# Patient Record
Sex: MALE | Race: BLACK OR AFRICAN AMERICAN | NOT HISPANIC OR LATINO
[De-identification: names, ages, dates, MRNs, and addresses within clinical notes are randomized per-mention and may not be internally consistent; named-entity substitution may affect disease eponyms.]

---

## 2017-03-08 ENCOUNTER — APPOINTMENT (OUTPATIENT)
Age: 37
End: 2017-03-08

## 2017-03-09 ENCOUNTER — APPOINTMENT (OUTPATIENT)
Age: 37
End: 2017-03-09

## 2017-03-09 VITALS
WEIGHT: 160 LBS | DIASTOLIC BLOOD PRESSURE: 97 MMHG | SYSTOLIC BLOOD PRESSURE: 149 MMHG | HEART RATE: 72 BPM | RESPIRATION RATE: 14 BRPM | BODY MASS INDEX: 23.7 KG/M2 | TEMPERATURE: 98.3 F | HEIGHT: 69 IN

## 2017-03-10 LAB
ACE BLD-CCNC: 78 U/L
ALBUMIN SERPL ELPH-MCNC: 4.1 G/DL
ALP BLD-CCNC: 391 U/L
ALT SERPL-CCNC: 143 U/L
ANION GAP SERPL CALC-SCNC: 12 MMOL/L
AST SERPL-CCNC: 88 U/L
BILIRUB SERPL-MCNC: 0.6 MG/DL
BUN SERPL-MCNC: 10 MG/DL
CALCIUM SERPL-MCNC: 10 MG/DL
CHLORIDE SERPL-SCNC: 99 MMOL/L
CO2 SERPL-SCNC: 24 MMOL/L
CREAT SERPL-MCNC: 0.88 MG/DL
MITOCHONDRIA AB SER IF-ACNC: NORMAL
POTASSIUM SERPL-SCNC: 3.8 MMOL/L
PROT SERPL-MCNC: 8.2 G/DL
SODIUM SERPL-SCNC: 135 MMOL/L

## 2017-03-13 LAB
ALP BLD-CCNC: 386 U/L
ALP BONE CFR SERPL: 17 %
ALP INTEST CFR SERPL: 4 %
ALP LIVER CFR SERPL: 78 %
ALP MACRO CFR SERPL: 0 %
ALP PLAC CFR SERPL: 0 %

## 2017-04-16 ENCOUNTER — FORM ENCOUNTER (OUTPATIENT)
Age: 37
End: 2017-04-16

## 2017-04-17 ENCOUNTER — OUTPATIENT (OUTPATIENT)
Dept: OUTPATIENT SERVICES | Facility: HOSPITAL | Age: 37
LOS: 1 days | End: 2017-04-17
Payer: COMMERCIAL

## 2017-04-17 ENCOUNTER — APPOINTMENT (OUTPATIENT)
Dept: MRI IMAGING | Facility: IMAGING CENTER | Age: 37
End: 2017-04-17

## 2017-04-17 DIAGNOSIS — Z00.00 ENCOUNTER FOR GENERAL ADULT MEDICAL EXAMINATION WITHOUT ABNORMAL FINDINGS: ICD-10-CM

## 2017-04-17 PROCEDURE — 74183 MRI ABD W/O CNTR FLWD CNTR: CPT

## 2017-04-17 PROCEDURE — A9585: CPT

## 2017-05-25 ENCOUNTER — APPOINTMENT (OUTPATIENT)
Age: 37
End: 2017-05-25

## 2017-05-25 VITALS
WEIGHT: 163 LBS | TEMPERATURE: 97.9 F | HEART RATE: 75 BPM | SYSTOLIC BLOOD PRESSURE: 142 MMHG | RESPIRATION RATE: 14 BRPM | BODY MASS INDEX: 24.14 KG/M2 | DIASTOLIC BLOOD PRESSURE: 75 MMHG | HEIGHT: 69 IN

## 2017-05-25 LAB
ALBUMIN SERPL ELPH-MCNC: 4 G/DL
ALP BLD-CCNC: 313 U/L
ALT SERPL-CCNC: 88 U/L
ANION GAP SERPL CALC-SCNC: 15 MMOL/L
AST SERPL-CCNC: 61 U/L
BILIRUB SERPL-MCNC: 0.7 MG/DL
BUN SERPL-MCNC: 11 MG/DL
CALCIUM SERPL-MCNC: 10 MG/DL
CHLORIDE SERPL-SCNC: 99 MMOL/L
CO2 SERPL-SCNC: 24 MMOL/L
CREAT SERPL-MCNC: 0.82 MG/DL
POTASSIUM SERPL-SCNC: 3.6 MMOL/L
PROT SERPL-MCNC: 8.4 G/DL
SODIUM SERPL-SCNC: 138 MMOL/L

## 2017-07-24 ENCOUNTER — APPOINTMENT (OUTPATIENT)
Age: 37
End: 2017-07-24

## 2018-01-18 ENCOUNTER — APPOINTMENT (OUTPATIENT)
Age: 38
End: 2018-01-18

## 2018-04-04 ENCOUNTER — APPOINTMENT (OUTPATIENT)
Age: 38
End: 2018-04-04
Payer: COMMERCIAL

## 2018-04-04 VITALS
SYSTOLIC BLOOD PRESSURE: 130 MMHG | HEIGHT: 69 IN | RESPIRATION RATE: 12 BRPM | DIASTOLIC BLOOD PRESSURE: 82 MMHG | WEIGHT: 150 LBS | BODY MASS INDEX: 22.22 KG/M2 | TEMPERATURE: 97.5 F | HEART RATE: 64 BPM

## 2018-04-04 PROCEDURE — 99214 OFFICE O/P EST MOD 30 MIN: CPT

## 2018-04-05 LAB
ALBUMIN SERPL ELPH-MCNC: 4.6 G/DL
ALP BLD-CCNC: 365 U/L
ALT SERPL-CCNC: 150 U/L
ANION GAP SERPL CALC-SCNC: 15 MMOL/L
AST SERPL-CCNC: 116 U/L
BILIRUB SERPL-MCNC: 1.1 MG/DL
BUN SERPL-MCNC: 11 MG/DL
CALCIUM SERPL-MCNC: 10.1 MG/DL
CANCER AG19-9 SERPL-ACNC: 9.9 U/ML
CHLORIDE SERPL-SCNC: 96 MMOL/L
CO2 SERPL-SCNC: 25 MMOL/L
CREAT SERPL-MCNC: 1.26 MG/DL
POTASSIUM SERPL-SCNC: 4.1 MMOL/L
PROT SERPL-MCNC: 8.6 G/DL
SODIUM SERPL-SCNC: 136 MMOL/L

## 2018-08-02 ENCOUNTER — APPOINTMENT (OUTPATIENT)
Dept: HEPATOLOGY | Facility: CLINIC | Age: 38
End: 2018-08-02

## 2018-08-09 ENCOUNTER — APPOINTMENT (OUTPATIENT)
Dept: MRI IMAGING | Facility: IMAGING CENTER | Age: 38
End: 2018-08-09

## 2018-09-10 ENCOUNTER — FORM ENCOUNTER (OUTPATIENT)
Age: 38
End: 2018-09-10

## 2018-09-11 ENCOUNTER — APPOINTMENT (OUTPATIENT)
Dept: ULTRASOUND IMAGING | Facility: IMAGING CENTER | Age: 38
End: 2018-09-11
Payer: MEDICAID

## 2018-09-11 ENCOUNTER — OUTPATIENT (OUTPATIENT)
Dept: OUTPATIENT SERVICES | Facility: HOSPITAL | Age: 38
LOS: 1 days | End: 2018-09-11
Payer: COMMERCIAL

## 2018-09-11 DIAGNOSIS — R79.9 ABNORMAL FINDING OF BLOOD CHEMISTRY, UNSPECIFIED: ICD-10-CM

## 2018-09-11 PROCEDURE — 76700 US EXAM ABDOM COMPLETE: CPT

## 2018-09-11 PROCEDURE — 76700 US EXAM ABDOM COMPLETE: CPT | Mod: 26

## 2018-11-07 ENCOUNTER — APPOINTMENT (OUTPATIENT)
Dept: HEPATOLOGY | Facility: CLINIC | Age: 38
End: 2018-11-07
Payer: MEDICAID

## 2018-11-07 VITALS
SYSTOLIC BLOOD PRESSURE: 143 MMHG | BODY MASS INDEX: 21.92 KG/M2 | HEART RATE: 66 BPM | HEIGHT: 69 IN | TEMPERATURE: 98.1 F | RESPIRATION RATE: 16 BRPM | DIASTOLIC BLOOD PRESSURE: 86 MMHG | WEIGHT: 148 LBS

## 2018-11-07 PROCEDURE — 99214 OFFICE O/P EST MOD 30 MIN: CPT

## 2018-11-08 LAB
AFP-TM SERPL-MCNC: 1.6 NG/ML
ALBUMIN SERPL ELPH-MCNC: 4.3 G/DL
ALP BLD-CCNC: 257 U/L
ALT SERPL-CCNC: 78 U/L
ANION GAP SERPL CALC-SCNC: 15 MMOL/L
AST SERPL-CCNC: 65 U/L
BASOPHILS # BLD AUTO: 0.04 K/UL
BASOPHILS NFR BLD AUTO: 0.7 %
BILIRUB SERPL-MCNC: 1.2 MG/DL
BUN SERPL-MCNC: 8 MG/DL
CALCIUM SERPL-MCNC: 10.4 MG/DL
CANCER AG19-9 SERPL-ACNC: 10 U/ML
CHLORIDE SERPL-SCNC: 99 MMOL/L
CO2 SERPL-SCNC: 25 MMOL/L
CREAT SERPL-MCNC: 0.81 MG/DL
EOSINOPHIL # BLD AUTO: 0.37 K/UL
EOSINOPHIL NFR BLD AUTO: 6.7 %
HCT VFR BLD CALC: 43.8 %
HGB BLD-MCNC: 14.6 G/DL
IMM GRANULOCYTES NFR BLD AUTO: 0.2 %
INR PPP: 1.16 RATIO
LYMPHOCYTES # BLD AUTO: 2.18 K/UL
LYMPHOCYTES NFR BLD AUTO: 39.4 %
MAN DIFF?: NORMAL
MCHC RBC-ENTMCNC: 30.2 PG
MCHC RBC-ENTMCNC: 33.3 GM/DL
MCV RBC AUTO: 90.7 FL
MONOCYTES # BLD AUTO: 0.57 K/UL
MONOCYTES NFR BLD AUTO: 10.3 %
NEUTROPHILS # BLD AUTO: 2.36 K/UL
NEUTROPHILS NFR BLD AUTO: 42.7 %
PLATELET # BLD AUTO: 309 K/UL
POTASSIUM SERPL-SCNC: 5.2 MMOL/L
PROT SERPL-MCNC: 8.3 G/DL
PT BLD: 13.1 SEC
RBC # BLD: 4.83 M/UL
RBC # FLD: 13.5 %
SODIUM SERPL-SCNC: 139 MMOL/L
WBC # FLD AUTO: 5.53 K/UL

## 2018-12-03 ENCOUNTER — FORM ENCOUNTER (OUTPATIENT)
Age: 38
End: 2018-12-03

## 2018-12-04 ENCOUNTER — APPOINTMENT (OUTPATIENT)
Dept: MRI IMAGING | Facility: IMAGING CENTER | Age: 38
End: 2018-12-04
Payer: MEDICAID

## 2018-12-04 ENCOUNTER — OUTPATIENT (OUTPATIENT)
Dept: OUTPATIENT SERVICES | Facility: HOSPITAL | Age: 38
LOS: 1 days | End: 2018-12-04
Payer: MEDICAID

## 2018-12-04 DIAGNOSIS — R79.9 ABNORMAL FINDING OF BLOOD CHEMISTRY, UNSPECIFIED: ICD-10-CM

## 2018-12-04 PROCEDURE — A9585: CPT

## 2018-12-04 PROCEDURE — 74183 MRI ABD W/O CNTR FLWD CNTR: CPT

## 2018-12-04 PROCEDURE — 74183 MRI ABD W/O CNTR FLWD CNTR: CPT | Mod: 26

## 2018-12-19 ENCOUNTER — APPOINTMENT (OUTPATIENT)
Dept: HEPATOLOGY | Facility: CLINIC | Age: 38
End: 2018-12-19

## 2019-01-28 ENCOUNTER — APPOINTMENT (OUTPATIENT)
Dept: HEPATOLOGY | Facility: CLINIC | Age: 39
End: 2019-01-28
Payer: MEDICAID

## 2019-01-28 VITALS
HEIGHT: 69 IN | DIASTOLIC BLOOD PRESSURE: 80 MMHG | BODY MASS INDEX: 21.92 KG/M2 | WEIGHT: 148 LBS | SYSTOLIC BLOOD PRESSURE: 135 MMHG | TEMPERATURE: 97.6 F | HEART RATE: 85 BPM | RESPIRATION RATE: 16 BRPM

## 2019-01-28 PROCEDURE — 99214 OFFICE O/P EST MOD 30 MIN: CPT

## 2019-01-29 LAB
ALBUMIN SERPL ELPH-MCNC: 4 G/DL
ALP BLD-CCNC: 248 U/L
ALT SERPL-CCNC: 82 U/L
ANION GAP SERPL CALC-SCNC: 9 MMOL/L
AST SERPL-CCNC: 58 U/L
BILIRUB SERPL-MCNC: 1.2 MG/DL
BUN SERPL-MCNC: 11 MG/DL
CALCIUM SERPL-MCNC: 9.9 MG/DL
CHLORIDE SERPL-SCNC: 101 MMOL/L
CO2 SERPL-SCNC: 27 MMOL/L
CREAT SERPL-MCNC: 0.75 MG/DL
POTASSIUM SERPL-SCNC: 4.6 MMOL/L
PROT SERPL-MCNC: 8.1 G/DL
SODIUM SERPL-SCNC: 137 MMOL/L

## 2019-09-19 ENCOUNTER — APPOINTMENT (OUTPATIENT)
Dept: HEPATOLOGY | Facility: CLINIC | Age: 39
End: 2019-09-19

## 2020-02-06 ENCOUNTER — APPOINTMENT (OUTPATIENT)
Dept: HEPATOLOGY | Facility: CLINIC | Age: 40
End: 2020-02-06
Payer: MEDICAID

## 2020-02-06 VITALS
HEIGHT: 69 IN | DIASTOLIC BLOOD PRESSURE: 74 MMHG | TEMPERATURE: 98 F | WEIGHT: 145 LBS | RESPIRATION RATE: 16 BRPM | HEART RATE: 76 BPM | SYSTOLIC BLOOD PRESSURE: 119 MMHG | BODY MASS INDEX: 21.48 KG/M2

## 2020-02-06 PROCEDURE — 99214 OFFICE O/P EST MOD 30 MIN: CPT

## 2020-02-07 LAB
AFP-TM SERPL-MCNC: <1.8 NG/ML
ALBUMIN SERPL ELPH-MCNC: 3.9 G/DL
ALP BLD-CCNC: 408 U/L
ALT SERPL-CCNC: 89 U/L
ANION GAP SERPL CALC-SCNC: 14 MMOL/L
AST SERPL-CCNC: 52 U/L
BASOPHILS # BLD AUTO: 0.04 K/UL
BASOPHILS NFR BLD AUTO: 0.7 %
BILIRUB SERPL-MCNC: 1.1 MG/DL
BUN SERPL-MCNC: 10 MG/DL
CALCIUM SERPL-MCNC: 9.5 MG/DL
CANCER AG19-9 SERPL-ACNC: 9 U/ML
CHLORIDE SERPL-SCNC: 100 MMOL/L
CO2 SERPL-SCNC: 26 MMOL/L
CREAT SERPL-MCNC: 0.84 MG/DL
EOSINOPHIL # BLD AUTO: 0.42 K/UL
EOSINOPHIL NFR BLD AUTO: 7.1 %
HCT VFR BLD CALC: 38.8 %
HGB BLD-MCNC: 12.8 G/DL
IMM GRANULOCYTES NFR BLD AUTO: 0.3 %
LYMPHOCYTES # BLD AUTO: 2.25 K/UL
LYMPHOCYTES NFR BLD AUTO: 38 %
MAN DIFF?: NORMAL
MCHC RBC-ENTMCNC: 30.3 PG
MCHC RBC-ENTMCNC: 33 GM/DL
MCV RBC AUTO: 91.9 FL
MONOCYTES # BLD AUTO: 0.49 K/UL
MONOCYTES NFR BLD AUTO: 8.3 %
NEUTROPHILS # BLD AUTO: 2.7 K/UL
NEUTROPHILS NFR BLD AUTO: 45.6 %
PLATELET # BLD AUTO: 258 K/UL
POTASSIUM SERPL-SCNC: 4.1 MMOL/L
PROT SERPL-MCNC: 7.6 G/DL
RBC # BLD: 4.22 M/UL
RBC # FLD: 12.7 %
SODIUM SERPL-SCNC: 140 MMOL/L
WBC # FLD AUTO: 5.92 K/UL

## 2020-02-09 LAB — MITOCHONDRIA AB SER IF-ACNC: NORMAL

## 2020-02-21 ENCOUNTER — APPOINTMENT (OUTPATIENT)
Dept: HEPATOLOGY | Facility: CLINIC | Age: 40
End: 2020-02-21
Payer: MEDICAID

## 2020-02-21 PROCEDURE — 91200 LIVER ELASTOGRAPHY: CPT

## 2020-07-10 ENCOUNTER — OUTPATIENT (OUTPATIENT)
Dept: OUTPATIENT SERVICES | Facility: HOSPITAL | Age: 40
LOS: 1 days | End: 2020-07-10
Payer: MEDICAID

## 2020-07-10 ENCOUNTER — APPOINTMENT (OUTPATIENT)
Dept: MRI IMAGING | Facility: IMAGING CENTER | Age: 40
End: 2020-07-10
Payer: MEDICAID

## 2020-07-10 ENCOUNTER — RESULT REVIEW (OUTPATIENT)
Age: 40
End: 2020-07-10

## 2020-07-10 DIAGNOSIS — R79.9 ABNORMAL FINDING OF BLOOD CHEMISTRY, UNSPECIFIED: ICD-10-CM

## 2020-07-10 DIAGNOSIS — Z00.8 ENCOUNTER FOR OTHER GENERAL EXAMINATION: ICD-10-CM

## 2020-07-10 PROCEDURE — 74183 MRI ABD W/O CNTR FLWD CNTR: CPT | Mod: 26

## 2020-07-10 PROCEDURE — 74183 MRI ABD W/O CNTR FLWD CNTR: CPT

## 2020-07-10 PROCEDURE — A9585: CPT

## 2020-10-22 ENCOUNTER — APPOINTMENT (OUTPATIENT)
Dept: HEPATOLOGY | Facility: CLINIC | Age: 40
End: 2020-10-22
Payer: MEDICAID

## 2020-10-22 VITALS
HEIGHT: 69 IN | TEMPERATURE: 97 F | RESPIRATION RATE: 16 BRPM | DIASTOLIC BLOOD PRESSURE: 82 MMHG | WEIGHT: 151 LBS | SYSTOLIC BLOOD PRESSURE: 139 MMHG | BODY MASS INDEX: 22.36 KG/M2 | HEART RATE: 73 BPM

## 2020-10-22 LAB
ALBUMIN SERPL ELPH-MCNC: 4.2 G/DL
ALP BLD-CCNC: 305 U/L
ALT SERPL-CCNC: 163 U/L
ANION GAP SERPL CALC-SCNC: 13 MMOL/L
AST SERPL-CCNC: 97 U/L
BASOPHILS # BLD AUTO: 0.04 K/UL
BASOPHILS NFR BLD AUTO: 1 %
BILIRUB SERPL-MCNC: 1 MG/DL
BUN SERPL-MCNC: 14 MG/DL
CALCIUM SERPL-MCNC: 10 MG/DL
CANCER AG19-9 SERPL-ACNC: 8 U/ML
CHLORIDE SERPL-SCNC: 102 MMOL/L
CO2 SERPL-SCNC: 26 MMOL/L
CREAT SERPL-MCNC: 0.78 MG/DL
EOSINOPHIL # BLD AUTO: 0.33 K/UL
EOSINOPHIL NFR BLD AUTO: 8.6 %
HCT VFR BLD CALC: 43.3 %
HGB BLD-MCNC: 14.4 G/DL
IMM GRANULOCYTES NFR BLD AUTO: 0 %
LYMPHOCYTES # BLD AUTO: 1.4 K/UL
LYMPHOCYTES NFR BLD AUTO: 36.5 %
MAN DIFF?: NORMAL
MCHC RBC-ENTMCNC: 30.8 PG
MCHC RBC-ENTMCNC: 33.3 GM/DL
MCV RBC AUTO: 92.7 FL
MONOCYTES # BLD AUTO: 0.54 K/UL
MONOCYTES NFR BLD AUTO: 14.1 %
NEUTROPHILS # BLD AUTO: 1.53 K/UL
NEUTROPHILS NFR BLD AUTO: 39.8 %
PLATELET # BLD AUTO: 262 K/UL
POTASSIUM SERPL-SCNC: 4.1 MMOL/L
PROT SERPL-MCNC: 7.8 G/DL
RBC # BLD: 4.67 M/UL
RBC # FLD: 13.1 %
SODIUM SERPL-SCNC: 141 MMOL/L
WBC # FLD AUTO: 3.84 K/UL

## 2020-10-22 PROCEDURE — 99214 OFFICE O/P EST MOD 30 MIN: CPT

## 2020-10-23 LAB
INR PPP: 1.09 RATIO
PT BLD: 12.9 SEC

## 2020-11-30 LAB — IGG4 SER-MCNC: 74.8 MG/DL

## 2021-01-21 ENCOUNTER — APPOINTMENT (OUTPATIENT)
Dept: HEPATOLOGY | Facility: CLINIC | Age: 41
End: 2021-01-21
Payer: MEDICAID

## 2021-01-21 VITALS
WEIGHT: 155 LBS | SYSTOLIC BLOOD PRESSURE: 126 MMHG | TEMPERATURE: 98.2 F | HEIGHT: 69 IN | RESPIRATION RATE: 16 BRPM | HEART RATE: 81 BPM | DIASTOLIC BLOOD PRESSURE: 81 MMHG | BODY MASS INDEX: 22.96 KG/M2

## 2021-01-21 PROCEDURE — 99214 OFFICE O/P EST MOD 30 MIN: CPT

## 2021-01-21 PROCEDURE — 99072 ADDL SUPL MATRL&STAF TM PHE: CPT

## 2021-01-22 LAB
AFP-TM SERPL-MCNC: 2 NG/ML
ALBUMIN SERPL ELPH-MCNC: 4.4 G/DL
ALP BLD-CCNC: 295 U/L
ALT SERPL-CCNC: 195 U/L
ANION GAP SERPL CALC-SCNC: 15 MMOL/L
AST SERPL-CCNC: 127 U/L
BASOPHILS # BLD AUTO: 0.06 K/UL
BASOPHILS NFR BLD AUTO: 1 %
BILIRUB SERPL-MCNC: 1.1 MG/DL
BUN SERPL-MCNC: 13 MG/DL
CALCIUM SERPL-MCNC: 10.1 MG/DL
CANCER AG19-9 SERPL-ACNC: 7 U/ML
CHLORIDE SERPL-SCNC: 98 MMOL/L
CO2 SERPL-SCNC: 26 MMOL/L
CREAT SERPL-MCNC: 1 MG/DL
EOSINOPHIL # BLD AUTO: 0.49 K/UL
EOSINOPHIL NFR BLD AUTO: 8.4 %
HCT VFR BLD CALC: 46.8 %
HGB BLD-MCNC: 14.9 G/DL
IMM GRANULOCYTES NFR BLD AUTO: 0.2 %
INR PPP: 1.12 RATIO
LYMPHOCYTES # BLD AUTO: 2.06 K/UL
LYMPHOCYTES NFR BLD AUTO: 35.5 %
MAN DIFF?: NORMAL
MCHC RBC-ENTMCNC: 30 PG
MCHC RBC-ENTMCNC: 31.8 GM/DL
MCV RBC AUTO: 94.4 FL
MONOCYTES # BLD AUTO: 0.64 K/UL
MONOCYTES NFR BLD AUTO: 11 %
NEUTROPHILS # BLD AUTO: 2.54 K/UL
NEUTROPHILS NFR BLD AUTO: 43.9 %
PLATELET # BLD AUTO: 259 K/UL
POTASSIUM SERPL-SCNC: 4.3 MMOL/L
PROT SERPL-MCNC: 8.4 G/DL
PT BLD: 13.2 SEC
RBC # BLD: 4.96 M/UL
RBC # FLD: 13.2 %
SODIUM SERPL-SCNC: 139 MMOL/L
WBC # FLD AUTO: 5.8 K/UL

## 2021-03-05 ENCOUNTER — RESULT REVIEW (OUTPATIENT)
Age: 41
End: 2021-03-05

## 2021-03-05 ENCOUNTER — OUTPATIENT (OUTPATIENT)
Dept: OUTPATIENT SERVICES | Facility: HOSPITAL | Age: 41
LOS: 1 days | End: 2021-03-05
Payer: MEDICAID

## 2021-03-05 ENCOUNTER — APPOINTMENT (OUTPATIENT)
Dept: MRI IMAGING | Facility: IMAGING CENTER | Age: 41
End: 2021-03-05
Payer: MEDICAID

## 2021-03-05 DIAGNOSIS — R79.9 ABNORMAL FINDING OF BLOOD CHEMISTRY, UNSPECIFIED: ICD-10-CM

## 2021-03-05 DIAGNOSIS — Z00.8 ENCOUNTER FOR OTHER GENERAL EXAMINATION: ICD-10-CM

## 2021-03-05 PROCEDURE — 74183 MRI ABD W/O CNTR FLWD CNTR: CPT

## 2021-03-05 PROCEDURE — 74183 MRI ABD W/O CNTR FLWD CNTR: CPT | Mod: 26

## 2021-03-05 PROCEDURE — A9585: CPT

## 2021-03-09 ENCOUNTER — NON-APPOINTMENT (OUTPATIENT)
Age: 41
End: 2021-03-09

## 2021-09-15 ENCOUNTER — APPOINTMENT (OUTPATIENT)
Dept: HEPATOLOGY | Facility: CLINIC | Age: 41
End: 2021-09-15
Payer: MEDICAID

## 2021-09-15 VITALS
SYSTOLIC BLOOD PRESSURE: 132 MMHG | DIASTOLIC BLOOD PRESSURE: 83 MMHG | RESPIRATION RATE: 16 BRPM | HEIGHT: 69 IN | HEART RATE: 78 BPM | WEIGHT: 150 LBS | BODY MASS INDEX: 22.22 KG/M2 | TEMPERATURE: 97.9 F

## 2021-09-15 LAB
AFP-TM SERPL-MCNC: <1.8 NG/ML
ALBUMIN SERPL ELPH-MCNC: 4.2 G/DL
ALP BLD-CCNC: 258 U/L
ALT SERPL-CCNC: 228 U/L
ANION GAP SERPL CALC-SCNC: 13 MMOL/L
AST SERPL-CCNC: 180 U/L
BASOPHILS # BLD AUTO: 0.05 K/UL
BASOPHILS NFR BLD AUTO: 1.2 %
BILIRUB SERPL-MCNC: 1 MG/DL
BUN SERPL-MCNC: 12 MG/DL
CALCIUM SERPL-MCNC: 9.8 MG/DL
CANCER AG19-9 SERPL-ACNC: 5 U/ML
CHLORIDE SERPL-SCNC: 102 MMOL/L
CO2 SERPL-SCNC: 24 MMOL/L
CREAT SERPL-MCNC: 0.75 MG/DL
EOSINOPHIL # BLD AUTO: 0.37 K/UL
EOSINOPHIL NFR BLD AUTO: 8.6 %
HCT VFR BLD CALC: 42.2 %
HGB BLD-MCNC: 14.1 G/DL
IMM GRANULOCYTES NFR BLD AUTO: 0.2 %
LYMPHOCYTES # BLD AUTO: 1.48 K/UL
LYMPHOCYTES NFR BLD AUTO: 34.5 %
MAN DIFF?: NORMAL
MCHC RBC-ENTMCNC: 30.6 PG
MCHC RBC-ENTMCNC: 33.4 GM/DL
MCV RBC AUTO: 91.5 FL
MONOCYTES # BLD AUTO: 0.48 K/UL
MONOCYTES NFR BLD AUTO: 11.2 %
NEUTROPHILS # BLD AUTO: 1.9 K/UL
NEUTROPHILS NFR BLD AUTO: 44.3 %
PLATELET # BLD AUTO: 240 K/UL
POTASSIUM SERPL-SCNC: 4 MMOL/L
PROT SERPL-MCNC: 7.4 G/DL
RBC # BLD: 4.61 M/UL
RBC # FLD: 13.2 %
SODIUM SERPL-SCNC: 139 MMOL/L
WBC # FLD AUTO: 4.29 K/UL

## 2021-09-15 PROCEDURE — 99214 OFFICE O/P EST MOD 30 MIN: CPT

## 2021-12-17 ENCOUNTER — EMERGENCY (EMERGENCY)
Facility: HOSPITAL | Age: 41
LOS: 1 days | Discharge: HOME | End: 2021-12-17
Attending: STUDENT IN AN ORGANIZED HEALTH CARE EDUCATION/TRAINING PROGRAM
Payer: SELF-PAY

## 2021-12-17 VITALS
HEART RATE: 77 BPM | SYSTOLIC BLOOD PRESSURE: 150 MMHG | OXYGEN SATURATION: 97 % | HEIGHT: 69 IN | RESPIRATION RATE: 18 BRPM | TEMPERATURE: 98 F | WEIGHT: 160.06 LBS | DIASTOLIC BLOOD PRESSURE: 93 MMHG

## 2021-12-17 PROCEDURE — 71046 X-RAY EXAM CHEST 2 VIEWS: CPT | Mod: 26

## 2021-12-17 PROCEDURE — 99053 MED SERV 10PM-8AM 24 HR FAC: CPT

## 2021-12-17 PROCEDURE — 99284 EMERGENCY DEPT VISIT MOD MDM: CPT

## 2021-12-17 RX ORDER — IBUPROFEN 200 MG
600 TABLET ORAL ONCE
Refills: 0 | Status: COMPLETED | OUTPATIENT
Start: 2021-12-17 | End: 2021-12-17

## 2021-12-17 RX ORDER — ACETAMINOPHEN 500 MG
650 TABLET ORAL ONCE
Refills: 0 | Status: COMPLETED | OUTPATIENT
Start: 2021-12-17 | End: 2021-12-17
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
## 2021-12-18 PROCEDURE — 71046 X-RAY EXAM CHEST 2 VIEWS: CPT

## 2021-12-18 PROCEDURE — 99283 EMERGENCY DEPT VISIT LOW MDM: CPT | Mod: 25

## 2021-12-18 RX ADMIN — Medication 650 MILLIGRAM(S): at 00:00

## 2021-12-18 RX ADMIN — Medication 600 MILLIGRAM(S): at 00:00
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
## 2021-12-18 NOTE — ED PROVIDER NOTE - CHPI ED SYMPTOMS NEG
no neck pain, nausea, vomiting, diarrhea, blurry vision, chest pain, shortness of breath/no loss of consciousness

## 2021-12-18 NOTE — ED PROVIDER NOTE - PATIENT PORTAL LINK FT
You can access the FollowMyHealth Patient Portal offered by [facility] by registering at the following website: [email or web address]. By joining [employer]’s FollowMyHealth portal, you will also be able to view your health information using other applications (apps) compatible with our system.

## 2021-12-18 NOTE — ED PROVIDER NOTE - PHYSICAL EXAMINATION
Vital Signs Reviewed  GEN: Comfortable, NAD, AAOx3  HEENT: NCAT, MMM, Neck Supple  RESP: CTAB, No rales/rhonchi/wheezing  CV: RRR, S1S2, No murmurs  ABD: No TTP, Soft, ND, No masses, No CVA Tenderness  Extrem/Skin: Equal pulses bilat, No cyanosis/edema/rashes. Paraspinal midline TTP w/o deformities.  Neuro: No focal deficits
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
## 2022-01-25 ENCOUNTER — RX RENEWAL (OUTPATIENT)
Age: 42
End: 2022-01-25

## 2022-05-05 ENCOUNTER — APPOINTMENT (OUTPATIENT)
Dept: HEPATOLOGY | Facility: CLINIC | Age: 42
End: 2022-05-05

## 2022-06-03 PROBLEM — Z78.9 OTHER SPECIFIED HEALTH STATUS: Chronic | Status: ACTIVE | Noted: 2021-12-23

## 2022-07-18 ENCOUNTER — LABORATORY RESULT (OUTPATIENT)
Age: 42
End: 2022-07-18

## 2022-07-18 ENCOUNTER — APPOINTMENT (OUTPATIENT)
Dept: HEPATOLOGY | Facility: CLINIC | Age: 42
End: 2022-07-18

## 2022-07-18 VITALS
HEIGHT: 69 IN | SYSTOLIC BLOOD PRESSURE: 135 MMHG | HEART RATE: 57 BPM | OXYGEN SATURATION: 99 % | TEMPERATURE: 98 F | DIASTOLIC BLOOD PRESSURE: 72 MMHG | BODY MASS INDEX: 22.81 KG/M2 | WEIGHT: 154 LBS | RESPIRATION RATE: 16 BRPM

## 2022-07-18 DIAGNOSIS — Z78.9 OTHER SPECIFIED HEALTH STATUS: ICD-10-CM

## 2022-07-18 DIAGNOSIS — R79.9 ABNORMAL FINDING OF BLOOD CHEMISTRY, UNSPECIFIED: ICD-10-CM

## 2022-07-18 PROCEDURE — 99215 OFFICE O/P EST HI 40 MIN: CPT

## 2022-07-19 DIAGNOSIS — R79.89 OTHER SPECIFIED ABNORMAL FINDINGS OF BLOOD CHEMISTRY: ICD-10-CM

## 2022-07-19 PROBLEM — Z78.9 IMMUNE TO HEPATITIS A: Status: ACTIVE | Noted: 2022-07-19

## 2022-07-19 LAB
25(OH)D3 SERPL-MCNC: 16.2 NG/ML
AFP-TM SERPL-MCNC: 1.9 NG/ML
BASOPHILS # BLD AUTO: 0.05 K/UL
BASOPHILS NFR BLD AUTO: 1 %
BILIRUB DIRECT SERPL-MCNC: 0.6 MG/DL
COVID-19 SPIKE DOMAIN ANTIBODY INTERPRETATION: POSITIVE
DEPRECATED KAPPA LC FREE/LAMBDA SER: 1.52 RATIO
EOSINOPHIL # BLD AUTO: 0.34 K/UL
EOSINOPHIL NFR BLD AUTO: 6.5 %
FOLATE SERPL-MCNC: 8.1 NG/ML
GGT SERPL-CCNC: 200 U/L
HBV CORE IGG+IGM SER QL: REACTIVE
HBV SURFACE AB SER QL: NONREACTIVE
HCT VFR BLD CALC: 42.3 %
HCV AB SER QL: NONREACTIVE
HCV S/CO RATIO: 0.12 S/CO
HEPATITIS A IGG ANTIBODY: REACTIVE
HEPB DNA PCR INT: NOT DETECTED
HEPB DNA PCR LOG: NOT DETECTED LOGIU/ML
HGB BLD-MCNC: 13.9 G/DL
IGA SER QL IEP: 383 MG/DL
IGG SER QL IEP: 1654 MG/DL
IGM SER QL IEP: 26 MG/DL
IMM GRANULOCYTES NFR BLD AUTO: 0 %
INR PPP: 1.15 RATIO
KAPPA LC CSF-MCNC: 1.89 MG/DL
KAPPA LC SERPL-MCNC: 2.88 MG/DL
LYMPHOCYTES # BLD AUTO: 2.1 K/UL
LYMPHOCYTES NFR BLD AUTO: 40.2 %
MAN DIFF?: NORMAL
MCHC RBC-ENTMCNC: 31.4 PG
MCHC RBC-ENTMCNC: 32.9 GM/DL
MCV RBC AUTO: 95.5 FL
MONOCYTES # BLD AUTO: 0.6 K/UL
MONOCYTES NFR BLD AUTO: 11.5 %
NEUTROPHILS # BLD AUTO: 2.14 K/UL
NEUTROPHILS NFR BLD AUTO: 40.8 %
PLATELET # BLD AUTO: 235 K/UL
PT BLD: 13.4 SEC
RBC # BLD: 4.43 M/UL
RBC # FLD: 13.3 %
SARS-COV-2 AB SERPL IA-ACNC: >250 U/ML
VIT B12 SERPL-MCNC: 461 PG/ML
WBC # FLD AUTO: 5.23 K/UL

## 2022-07-19 RX ORDER — ERGOCALCIFEROL 1.25 MG/1
1.25 MG CAPSULE, LIQUID FILLED ORAL
Qty: 12 | Refills: 0 | Status: ACTIVE | COMMUNITY
Start: 2022-07-19 | End: 1900-01-01

## 2022-07-19 NOTE — PHYSICAL EXAM
[Non-Tender] : non-tender [Cognitive Mini-Mental Status Normal?] : Cognitive Mini Mental Status Exam is normal [General Appearance - Alert] : alert [General Appearance - In No Acute Distress] : in no acute distress [Neck Appearance] : the appearance of the neck was normal [Neck Cervical Mass (___cm)] : no neck mass was observed [Jugular Venous Distention Increased] : there was no jugular-venous distention [Thyroid Diffuse Enlargement] : the thyroid was not enlarged [Thyroid Nodule] : there were no palpable thyroid nodules [Auscultation Breath Sounds / Voice Sounds] : lungs were clear to auscultation bilaterally [Heart Rate And Rhythm] : heart rate was normal and rhythm regular [Heart Sounds] : normal S1 and S2 [Heart Sounds Gallop] : no gallops [Murmurs] : no murmurs [Heart Sounds Pericardial Friction Rub] : no pericardial rub [Bowel Sounds] : normal bowel sounds [Abdomen Soft] : soft [Abdomen Tenderness] : non-tender [Abdomen Mass (___ Cm)] : no abdominal mass palpated [No CVA Tenderness] : no ~M costovertebral angle tenderness [No Spinal Tenderness] : no spinal tenderness [Abnormal Walk] : normal gait [Nail Clubbing] : no clubbing  or cyanosis of the fingernails [Musculoskeletal - Swelling] : no joint swelling seen [Motor Tone] : muscle strength and tone were normal [Deep Tendon Reflexes (DTR)] : deep tendon reflexes were 2+ and symmetric [Sensation] : the sensory exam was normal to light touch and pinprick [No Focal Deficits] : no focal deficits [Oriented To Time, Place, And Person] : oriented to person, place, and time [Impaired Insight] : insight and judgment were intact [Affect] : the affect was normal [Hepatojugular Reflux] : patient did not have a sustained hepatojugular reflux [Spider Angioma] : No spider angioma(s) were observed [Abdominal Bruit] : no abdominal bruit [Abdominal  Ascites] : no ascites [Ascites Fluid Wave] : no ascites fluid wave [Splenomegaly] : no splenomegaly [Asterixis] : no asterixis observed [Jaundice] : No jaundice [Palmar Erythema] : no Palmar Erythema [Depression] : no depression [Sclera] : the sclera and conjunctiva were normal [Edema] : there was no peripheral edema [Veins - Varicosity Changes] : there were no varicosital changes [Cervical Lymph Nodes Enlarged Posterior Bilaterally] : posterior cervical [] : no rash [Skin Lesions] : no skin lesions

## 2022-07-19 NOTE — HISTORY OF PRESENT ILLNESS
[de-identified] : Mr. WAGNER WEISS is 41 year old male who is being seen for follow up visit with elevated liver enzymes since 2017, Was last seen by Dr. Kitchen on 09/15/2021. He has been taking Jojo since April 2018. Denies abdominal discomfort, pruritus.  \par \par Liver biopsy March 21, 2016 showing minimal portal inflammation without fibrosis. \par Liver biopsy December 21, 2014 shows mild portal fibrosis without bridging no steatosis or inflammation.\par \par Immune to HAV (07/18/22) not immune to HBV with anti-HBc+, NR to HCV, COVID+ spike> 250.\par Labs on 07/18/22 shows elevated AST//339, , , High Dbil 0.6; WDL- Tbil 0.9, AFP, INR, CBC, B12/folate; low Vit D 16.2, pending… AI panel, HBV and HDV panel with Bile acids.\par \par MRI March 5, 2021 showed no significant changes within the liver complex had 2 previous imaging. Blood tests January 21, 2021 alkaline phosphatase 295, , \par \par MRI July 10, 2020 shows a normal-appearing liver without steatosis and no focal lesions.  There is a description of diffuse abnormal restricted diffusion seen.  A periaortic lymph node has increased in size from 1.6 x 1.1 cm to 2.1 x 1.7 cm.\par Blood test February 6, 2020 alkaline phosphatase 408, ALT 48, AST 52, creatinine 0.84 CA 19-9 9, platelet count 258,000 antimitochondrial antibody negative\par \par Blood tests January 28, 2019 ALT 82, AST 38, alkaline phosphatase 248.\par \par MRI December 4, 2018 shows no lesions in the liver with an unremarkable biliary tree.\par Blood test November 7, 2018 platelet count 309,000, INR 1.16, ALT 78, AST 65, alkaline phosphatase 257, alpha-fetoprotein 1.6, CA 19-9 10\par \par Abdominal sonogram September 11, 2018 shows no lesions in the liver and a normal-appearing liver\par Blood tests April 4, 2018 alkaline phosphatase 365, , , creatinine 1.26\par \par Blood tests March 21, 2018 , , alkaline phosphatase 478, total bilirubin 1.2, and creatinine 0.72. Hepatitis A total antibody positive, hepatitis B surface antibody at a low level, hepatitis B core antibody positive\par \par Blood tests April 17, 2017 revealed a normal liver and normal bile ducts. Blood tests March 9, 2017 alkaline phosphatase 391, , AST 88, creatinine 0.88, ACE level normal AMA negative\par \par Blood tests December 7, 2015 alkaline phosphatase 360, , AST 81 total bilirubin 0.6, Ceruloplasmin 37, RENETTA negative, RENETTA negative, serum immunoglobulins were normal. GGTP 291, November 6, 2014 alkaline phosphatase 376, ALT 97, HIV negative, alpha-fetoprotein 1,\par MRI MRCP January 31, 2015 with no biliary tract abnormalities. Alkaline phosphatase isoenzymes revealed predominant liver. \par

## 2022-07-19 NOTE — ASSESSMENT
[FreeTextEntry1] : Mr. WAGNER WEISS is 41 year old male who is being seen for follow up visit with elevated liver enzymes since 2017, Was last seen by Dr. Kitchen on 09/15/2021. He has been taking Jojo since April 2018. \par \par # Elevated Liver enzymes – Labs on 07/18/22 shows elevated AST//339, , , High Dbil 0.6; WDL- Tbil 0.9. \par R/o ALD - pending… AI panel, HBV and HDV panel with Bile acids.\par Reviewed the up trending liver enzymes with an extensive work up to date has been non diagnostic. \par Reviewed the MRI March 5, 2021 showed no significant changes within the liver complex had 2 previous imaging. I am unsure of the etiology of his abnormal liver tests. \par \par >> Fibrosis – Ordered and MRE/MRCP pending FA, US and FS ordered meanwhile as Liver biopsy shows minimal portal fibrosis without bridging and no inflammation in 2016 and 2014. \par >> I have recommended continuing JOJO 1000 mg twice a day. I have reviewed the rationale behind this and the potential side effects. Although does not seem to help lower the enzymes.\par \par # Immune to HAV (07/18/22) not immune to HBV with anti-HBc+, NR to HCV, COVID+ spike> 250.\par \par # Lymph nodes - Reviewed his March 2021 MRI with him.  I have recommended repeat MRI was not approved by his insurance in Feb 2022.  The lymphadenopathy previously seen on imaging in 2021 not changed in size from those seen in 2020.\par \par PLAN to F/U in 2 months with repeat laboratory tests and MRE/CP, if there is no significant changes in the US/FS with labs ordered today, advised to call back to discuss the results.\par Encouraged to call back in the interim with any issues or concerns so that we can address and assist as required.

## 2022-07-20 LAB
MITOCHONDRIA AB SER IF-ACNC: NORMAL
SMOOTH MUSCLE AB SER QL IF: NORMAL

## 2022-07-21 LAB
ANA PAT FLD IF-IMP: ABNORMAL
ANA SER IF-ACNC: ABNORMAL
BILE AC SER-MCNC: 323.2 UMOL/L

## 2022-07-22 ENCOUNTER — NON-APPOINTMENT (OUTPATIENT)
Age: 42
End: 2022-07-22

## 2022-07-22 LAB
ALBUMIN SERPL ELPH-MCNC: 4.1 G/DL
ALP BLD-CCNC: 237 U/L
ALT SERPL-CCNC: 339 U/L
ANION GAP SERPL CALC-SCNC: 12 MMOL/L
AST SERPL-CCNC: 219 U/L
BILIRUB SERPL-MCNC: 0.9 MG/DL
BUN SERPL-MCNC: 17 MG/DL
CALCIUM SERPL-MCNC: 9.6 MG/DL
CHLORIDE SERPL-SCNC: 103 MMOL/L
CO2 SERPL-SCNC: 26 MMOL/L
CREAT SERPL-MCNC: 0.93 MG/DL
EGFR: 104 ML/MIN/1.73M2
POTASSIUM SERPL-SCNC: 4 MMOL/L
PROT SERPL-MCNC: 7.6 G/DL
SODIUM SERPL-SCNC: 140 MMOL/L

## 2022-07-25 LAB — HDV AB SER IA-ACNC: NEGATIVE

## 2022-07-26 ENCOUNTER — NON-APPOINTMENT (OUTPATIENT)
Age: 42
End: 2022-07-26

## 2022-08-17 ENCOUNTER — APPOINTMENT (OUTPATIENT)
Dept: ULTRASOUND IMAGING | Facility: HOSPITAL | Age: 42
End: 2022-08-17

## 2022-08-25 ENCOUNTER — EMERGENCY (EMERGENCY)
Facility: HOSPITAL | Age: 42
LOS: 1 days | Discharge: HOME | End: 2022-08-25
Attending: EMERGENCY MEDICINE
Payer: MEDICAID

## 2022-08-25 ENCOUNTER — EMERGENCY (EMERGENCY)
Facility: HOSPITAL | Age: 42
LOS: 1 days | Discharge: HOME | End: 2022-08-25
Attending: EMERGENCY MEDICINE | Admitting: EMERGENCY MEDICINE

## 2022-08-25 VITALS
SYSTOLIC BLOOD PRESSURE: 123 MMHG | OXYGEN SATURATION: 100 % | HEART RATE: 68 BPM | RESPIRATION RATE: 16 BRPM | TEMPERATURE: 98 F | DIASTOLIC BLOOD PRESSURE: 86 MMHG

## 2022-08-25 VITALS
TEMPERATURE: 98 F | OXYGEN SATURATION: 100 % | HEIGHT: 69 IN | HEART RATE: 97 BPM | WEIGHT: 149.91 LBS | DIASTOLIC BLOOD PRESSURE: 99 MMHG | SYSTOLIC BLOOD PRESSURE: 154 MMHG | RESPIRATION RATE: 16 BRPM

## 2022-08-25 VITALS
OXYGEN SATURATION: 99 % | TEMPERATURE: 98 F | DIASTOLIC BLOOD PRESSURE: 91 MMHG | RESPIRATION RATE: 18 BRPM | HEIGHT: 69 IN | SYSTOLIC BLOOD PRESSURE: 123 MMHG | HEART RATE: 89 BPM

## 2022-08-25 PROCEDURE — 99283 EMERGENCY DEPT VISIT LOW MDM: CPT

## 2022-08-25 PROCEDURE — 99284 EMERGENCY DEPT VISIT MOD MDM: CPT

## 2022-08-25 RX ORDER — AMOXICILLIN 250 MG/5ML
1 SUSPENSION, RECONSTITUTED, ORAL (ML) ORAL
Qty: 14 | Refills: 0
Start: 2022-08-25 | End: 2022-08-31

## 2022-08-25 RX ORDER — AMOXICILLIN 250 MG/5ML
500 SUSPENSION, RECONSTITUTED, ORAL (ML) ORAL ONCE
Refills: 0 | Status: DISCONTINUED | OUTPATIENT
Start: 2022-08-25 | End: 2022-08-25

## 2022-08-25 RX ORDER — IBUPROFEN 200 MG
800 TABLET ORAL ONCE
Refills: 0 | Status: COMPLETED | OUTPATIENT
Start: 2022-08-25 | End: 2022-08-25

## 2022-08-25 RX ADMIN — Medication 800 MILLIGRAM(S): at 08:43

## 2022-08-25 RX ADMIN — Medication 800 MILLIGRAM(S): at 08:45

## 2022-08-25 RX ADMIN — Medication 1 TABLET(S): at 08:43

## 2022-08-25 NOTE — ED PROVIDER NOTE - NSFOLLOWUPINSTRUCTIONS_ED_ALL_ED_FT
YOU WERE SEEN IN THE ER FOR AN ABSCESS (INFECTION) OF YOUR GUMS.  YOU NEED TO TAKE ANTIBIOTICS FOR THIS, AND SEE YOUR DENTIST IN 1 WEEK FOR FOLLOW UP.    1. TAKE ALL ANTIBIOTICS AS DIRECTED.  2. FOR PAIN YOU CAN TAKE OVER THE COUNTER IBUPROFEN AS DIRECTED ON PACKAGING.  3. RETURN TO THE ER FOR ANY WORSENING SYMPTOMS OR COMPLAINTS.

## 2022-08-25 NOTE — ED ADULT TRIAGE NOTE - CHIEF COMPLAINT QUOTE
Patient went to [facility] and was sent to this ER for dental for infection. Tooth pain to left side.
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
## 2022-08-25 NOTE — ED PROVIDER NOTE - PATIENT PORTAL LINK FT
You can access the FollowMyHealth Patient Portal offered by [facility] by registering at the following website: [email or web address]. By joining [employer]’s FollowMyHealth portal, you will also be able to view your health information using other applications (apps) compatible with our system.

## 2022-08-25 NOTE — ED PROVIDER NOTE - CLINICAL SUMMARY MEDICAL DECISION MAKING FREE TEXT BOX
pt with gum swelling consistent with likely abscess, but no facial or neck/throat swelling to suggest spread of abscess; no indication for imaging at this time; will have pt seen by dental; pt declines further pain control at this time

## 2022-08-25 NOTE — ED PROVIDER NOTE - PHYSICAL EXAMINATION
left upper gum swelling and fluctuance around tooth #13. no dental tenderness to palpation. no sublingual swelling or tenderness, no pharyngeal swelling. no trismus, awake alert, clear speech

## 2022-08-25 NOTE — ED PROVIDER NOTE - NSFOLLOWUPINSTRUCTIONS_ED_ALL_ED_FT
You need to straight to the [facility] Emergency Department to be treated by the Dental specialists.     [phone] [place] [id number]      Dental Abscess    Cross-sectional view of two teeth: one tooth is normal and the other tooth has an abscess.   A dental abscess is an infection around a tooth that may involve pain, swelling, and a collection of pus, as well as other symptoms. Treatment is important to help with symptoms and to prevent the infection from spreading.    The general types of dental abscesses are:  •Pulpal abscess. This abscess may form from the inner part of the tooth (pulp).      •Periodontal abscess. This abscess may form from the gum.        What are the causes?    This condition is caused by a bacterial infection in or around the tooth. It may result from:  •Severe tooth decay (cavities).      •Trauma to the tooth, such as a broken or chipped tooth.        What increases the risk?    This condition is more likely to develop in males. It is also more likely to develop in people who:  •Have cavities.      •Have severe gum disease.      •Eat sugary snacks between meals.      •Use tobacco products.      •Have diabetes.      •Have a weakened disease-fighting system (immune system).      •Do not brush and care for their teeth regularly.        What are the signs or symptoms?    Mild symptoms of this condition include:  •Tenderness.      •Bad breath.      •Fever.      •A bitter taste in the mouth.      •Pain in and around the infected tooth.      Moderate symptoms of this condition include:  •Swollen neck glands.      •Chills.      •Pus drainage.      •Swelling and redness around the infected tooth, in the mouth, or in the face.      •Severe pain in and around the infected tooth.      Severe symptoms of this condition include:  •Difficulty swallowing.      •Difficulty opening the mouth.      •Nausea.      •Vomiting.        How is this diagnosed?    This condition is diagnosed based on:  •Your symptoms and your medical and dental history.      •An examination of the infected tooth. During the exam, your dental care provider may tap on the infected tooth.      You may also need to have X-rays taken of the affected area.      How is this treated?    This condition is treated by getting rid of the infection. This may be done with:  •Antibiotic medicines. These may be used in certain situations.      •Antibacterial mouth rinse.      •Incision and drainage. This procedure is done by making an incision in the abscess to drain out the pus. Removing pus is the first priority in treating an abscess.      •A root canal. This may be performed to save the tooth. Your dental care provider accesses the visible part of your tooth (crown) with a drill and removes any infected pulp. Then the space is filled and sealed off.      •Tooth extraction. The tooth is pulled out if it cannot be saved by other treatment.      You may also receive treatment for pain, such as:  •Acetaminophen or NSAIDs.      •Gels that contain a numbing medicine.      •An injection to block the pain near your nerve.        Follow these instructions at home:    Medicines     •Take over-the-counter and prescription medicines only as told by your dental care provider.      •If you were prescribed an antibiotic, take it as told by your dental care provider. Do not stop taking the antibiotic even if you start to feel better.      •If you were prescribed a gel that contains a numbing medicine, use it exactly as told in the directions. Do not use these gels for children who are younger than 2 years of age.      •Use an antibacterial mouth rinse as told by your dental care provider.        General instructions   A do not smoke cigarettes sign.    •Gargle with a mixture of salt and water 3–4 times a day or as needed. To make salt water, completely dissolve ½–1 tsp (3–6 g) of salt in 1 cup (237 mL) of warm water.      •Eat a soft diet while your abscess is healing.      •Drink enough fluid to keep your urine pale yellow.      • Do not apply heat to the outside of your mouth.      • Do not use any products that contain nicotine or tobacco. These products include cigarettes, chewing tobacco, and vaping devices, such as e-cigarettes. If you need help quitting, ask your dental care provider.      •Keep all follow-up visits. This is important.        How is this prevented?  How to use dental floss.   •Excellent dental home care, which includes brushing your teeth every morning and night with fluoride toothpaste. Floss one time each day.      •Get regularly scheduled dental cleanings.      •Consider having a dental sealant applied on teeth that have deep grooves to prevent cavities.      •Drink fluoridated water regularly. This includes most tap water. Check the label on bottled water to see if it contains fluoride.      •Reduce or eliminate sugary drinks.      •Eat healthy meals and snacks.      •Wear a mouth guard or face shield to protect your teeth while playing sports.        Contact a health care provider if:    •Your pain is worse and is not helped by medicine.      •You have swelling.      •You see pus around the tooth.      •You have a fever or chills.        Get help right away if:    •Your symptoms suddenly get worse.      •You have a very bad headache.      •You have problems breathing or swallowing.      •You have trouble opening your mouth.      •You have swelling in your neck or around your eye.      These symptoms may represent a serious problem that is an emergency. Do not wait to see if the symptoms will go away. Get medical help right away. Call your local emergency services (911 in the [place] Do not drive yourself to the hospital.       Summary    •A dental abscess is a collection of pus in or around a tooth that results from an infection.      •A dental abscess may result from severe tooth decay, trauma to the tooth, or severe gum disease around a tooth.      •Symptoms include severe pain, swelling, redness, and drainage of pus in and around the infected tooth.      •The first priority in treating a dental abscess is to drain out the pus. Treatment may also involve removing damage inside the tooth (root canal) or extracting the tooth.      This information is not intended to replace advice given to you by your health care provider. Make sure you discuss any questions you have with your health care provider.

## 2022-08-25 NOTE — CONSULT NOTE ADULT - SUBJECTIVE AND OBJECTIVE BOX
Patient is a 42y old  Male who presents with a chief complaint of tooth pain and palatal swelling    HPI: Patient reports pain and swelling started yesterday.    PAST MEDICAL & SURGICAL HISTORY:  No pertinent past medical history    No significant past surgical history    MEDICATIONS: Pt denies    Allergies: Pt denies    Vital Signs Last 24 Hrs  T(C): 36.7 (25 Aug 2022 09:39), Max: 36.7 (25 Aug 2022 09:39)  T(F): 98 (25 Aug 2022 09:39), Max: 98 (25 Aug 2022 09:39)  HR: 89 (25 Aug 2022 09:39) (89 - 97)  BP: 123/91 (25 Aug 2022 09:39) (123/91 - 154/99)  BP(mean): --  RR: 18 (25 Aug 2022 09:39) (16 - 18)  SpO2: 99% (25 Aug 2022 09:39) (99% - 100%)    Parameters below as of 25 Aug 2022 09:39  Patient On (Oxygen Delivery Method): room air        EOE:  TMJ ( - ) clicks                    (  -  ) pops                    (  -  ) crepitus               ( -  ) trismus             (  - ) LAD             (  - ) swelling             (  - ) asymmetry             (  - ) palpation             (  - ) SOB             (  - ) dysphagia             (  - ) LOC    IOE:  Permanent dentition grossly intact.           ( +  ) palpation palatal site #14           ( +  ) fluctuant swelling palatal site #14, self draining    Radiographs: PAN taken and interpreted revealing periapical radiolucency #14    Assessment: PARL tooth #14 with associated draining palatal swelling.    Procedure:  Applied 20% topical benzocaine. Anesthetized with 2 carpules of 2% lidocaine w/ 1:100,000 epi changing needles in between each injection. Extruded purulence and heme from already draining abscess. Irrigated with sterile saline.    Informed patient need to follow up with outpatient dentist for RCT or extraction tooth #14.    Recommendations:  1) Abx and pain meds as per ED team.  2) Recommend follow up with outpatient dentist for treatment #14 and comprehensive dental care.    Helen Pascual DDS #[id number]  Kiara Schneider DDS #[id number]

## 2022-08-25 NOTE — ED PROVIDER NOTE - OBJECTIVE STATEMENT
PT with upper gum swelling since yesterday. tried squeezing it yesterday and it just made the pain worse. now pain is radiating to neck and temple. no fever. no personal dentist

## 2022-08-25 NOTE — ED PROVIDER NOTE - WET READ LAUNCH FT
Bed: FT01  Expected date:   Expected time:   Means of arrival:   Comments:  Rash     There are no Wet Read(s) to document. 98.1

## 2022-08-25 NOTE — ED PROVIDER NOTE - PROGRESS NOTE DETAILS
Dr. Santo: dental called, will see pt Dr. Santo: pt evaluated by dental, abscess expressed, recommend amox and pt to follow up with his dentist; I discussed this with pt and gave first dose of abx in ED; pt has dentist that he states he can see next week Dr. Santo: pt evaluated by dental, abscess expressed, recommend amox and pt to follow up with his dentist; I discussed this with pt; pt received Augmentin 1 dose at [facility] so no need to give abx dose now; pt has dentist that he states he can see next week

## 2022-08-25 NOTE — ED PROVIDER NOTE - OBJECTIVE STATEMENT
Dr. Santo: 43 yo male with no sig PMH, in ED for dental evaluation due to swelling of gums adjacent to teeth 13 and 14.  Pt states that yesterday he started to develop swelling to this area.  He has pain in the area, with pain extending into left jaw/neck, but no pain with swallowing, no difficulty swallowing, and no swelling anywhere else.  No fever, CP/SOB, N/V/D or abdominal pain.  Pt initially went to [facility] for evaluation of this, was recommended to see dental, and drove himself to [facility] for dental eval.  He received pain control at [facility] and declines further pain control in ED now.

## 2022-08-25 NOTE — ED PROVIDER NOTE - CLINICAL SUMMARY MEDICAL DECISION MAKING FREE TEXT BOX
PT with clinical exam suspicious of dental abscess. I was planning on obtaining labs, CT scan and transfer speak to transfer center to transfer patient to [facility]  Emergency Department for dental management but patient declined. Patient prefers to drive to [facility] Emergency Department himself. will give ibuprofen and dose of antibiotics. patient declined  bloodwork or any other treatment in the ED

## 2022-08-25 NOTE — ED PROVIDER NOTE - PHYSICAL EXAMINATION
oral exam: discreet area of fluctuant gum swelling on palatal side of teeth #13 and #14.  No erythema or cellulitis.  Tongue and uvula midline.  No tongue elevation.  Throat patent, with normal tonsils, and no exudates.    face and neck normal appearing, without swelling and no TTP    neck supple and full ROM    no trismus, full ROM to jaw

## 2022-08-25 NOTE — ED PROVIDER NOTE - PATIENT PORTAL LINK FT
You can access the FollowMyHealth Patient Portal offered by [facility] by registering at the following website: [email or web address]. By joining "[employer]’s FollowMyHealth portal, you will also be able to view your health information using other applications (apps) compatible with our system.

## 2022-09-09 ENCOUNTER — EMERGENCY (EMERGENCY)
Facility: HOSPITAL | Age: 42
LOS: 1 days | Discharge: HOME | End: 2022-09-09
Attending: STUDENT IN AN ORGANIZED HEALTH CARE EDUCATION/TRAINING PROGRAM
Payer: MEDICAID

## 2022-09-09 VITALS
DIASTOLIC BLOOD PRESSURE: 98 MMHG | HEIGHT: 69 IN | SYSTOLIC BLOOD PRESSURE: 155 MMHG | TEMPERATURE: 98 F | WEIGHT: 149.91 LBS | OXYGEN SATURATION: 98 % | RESPIRATION RATE: 17 BRPM | HEART RATE: 89 BPM

## 2022-09-09 VITALS
OXYGEN SATURATION: 99 % | RESPIRATION RATE: 18 BRPM | DIASTOLIC BLOOD PRESSURE: 87 MMHG | TEMPERATURE: 98 F | SYSTOLIC BLOOD PRESSURE: 121 MMHG | HEART RATE: 75 BPM

## 2022-09-09 PROCEDURE — 99283 EMERGENCY DEPT VISIT LOW MDM: CPT

## 2022-09-09 RX ORDER — IBUPROFEN 200 MG
600 TABLET ORAL ONCE
Refills: 0 | Status: COMPLETED | OUTPATIENT
Start: 2022-09-09 | End: 2022-09-09

## 2022-09-09 RX ORDER — IBUPROFEN 200 MG
1 TABLET ORAL
Qty: 20 | Refills: 0
Start: 2022-09-09

## 2022-09-09 RX ADMIN — Medication 600 MILLIGRAM(S): at 19:51

## 2022-09-09 RX ADMIN — Medication 600 MILLIGRAM(S): at 19:55

## 2022-09-09 NOTE — ED PROVIDER NOTE - PATIENT PORTAL LINK FT
You can access the FollowMyHealth Patient Portal offered by [facility] by registering at the following website: [email or web address]. By joining [facility] FollowMyHealth portal, you will also be able to view your health information using other applications (apps) compatible with our system.

## 2022-09-09 NOTE — ED ADULT TRIAGE NOTE - NS ED TRIAGE AVPU SCALE
Alert-The patient is alert, awake and responds to voice. The patient is oriented to time, place, and person. The triage nurse is able to obtain subjective information. Area L Indication Text: Tumors in this location are included in Area L (trunk and extremities).  Mohs surgery is indicated for larger tumors, or tumors with aggressive histologic features, in these anatomic locations.

## 2022-09-09 NOTE — ED PROVIDER NOTE - CLINICAL SUMMARY MEDICAL DECISION MAKING FREE TEXT BOX
Luis Daniel: 42 year old male with no pertinent PMH presents with right sided headache today. Pt reports gradual onset right sided frontal headache today. Pt reports growth of skin from medial aspect of right eye over the past 3 months, attributes to current headache. Denies any fevers, chest pain, shortness of breath, abdominal pain, vomiting, diarrhea, vision change, pain with eye movement, numbness, weakness, or rash. Denies any recent injury or trauma. Denies worst headache of his life or sudden onset. Denies taking any OTC medications for his pain. Pt with pterygium, no eye redness, vision change, or eye pain to suggest acute glaucoma or intraocular process. No pain with eye movement, neuro exam nonfocal. No evidence of subarachnoid hemorrhage, intracranial bleed, meningitis, encephalitis, temporal arteritis, or intracranial mass. Will provide symptomatic treatment, DC with ophthalmology follow up with return precautions. Pt understood and agreeable with plan.

## 2022-09-09 NOTE — ED ADULT NURSE NOTE - NSIMPLEMENTINTERV_GEN_ALL_ED
Implemented All Universal Safety Interventions:  [place] to call system. Call bell, personal items and telephone within reach. Instruct patient to call for assistance. Room bathroom lighting operational. Non-slip footwear when patient is off stretcher. Physically safe environment: no spills, clutter or unnecessary equipment. Stretcher in lowest position, wheels locked, appropriate side rails in place.

## 2022-09-09 NOTE — ED PROVIDER NOTE - NSFOLLOWUPINSTRUCTIONS_ED_ALL_ED_FT
Follow up with ophthalmologist as discussed.    Take prescription ibuprofen every 6 hours with food as needed for pain.    Take over the counter acetaminophen (Tylenol) 650-1000 mg every 4-6 hours as needed for pain. Do not take more than 3000 mg in a 24 hour period. Be aware many over the counter and prescription medications also contain acetaminophen (Tylenol).     Return with any new or worsening symptoms or concerns.  ___________________  A headache is pain or discomfort felt around the head or neck area. The specific cause of a headache may not be found. There are many causes and types of headaches. A few common ones are:    Tension headaches.  Migraine headaches.  Cluster headaches.  Chronic daily headaches.    Follow these instructions at home:  Watch your condition for any changes. Let your health care provider know about them. Take these steps to help with your condition:        Managing pain      Take over-the-counter and prescription medicines only as told by your health care provider.  Lie down in a dark, quiet room when you have a headache.  If directed, put ice on your head and neck area:    Put ice in a plastic bag.  Place a towel between your skin and the bag.  Leave the ice on for 20 minutes, 2–3 times per day.  If directed, apply heat to the affected area. Use the heat source that your health care provider recommends, such as a moist heat pack or a heating pad.    Place a towel between your skin and the heat source.  Leave the heat on for 20–30 minutes.  Remove the heat if your skin turns bright red. This is especially important if you are unable to feel pain, heat, or cold. You may have a greater risk of getting burned.  Keep lights dim if bright lights bother you or make your headaches worse.        Eating and drinking    Eat meals on a regular schedule.  If you drink alcohol:    Limit how much you use to:     0–1 drink a day for women.   0–2 drinks a day for men.   Be aware of how much alcohol is in your drink. In the [place] one drink equals one 12 oz bottle of beer (355 mL), one 5 oz glass of wine (148 mL), or one 1½ oz glass of hard liquor (44 mL).  Stop drinking caffeine, or decrease the amount of caffeine you drink.        General instructions     Keep a headache journal to help find out what may trigger your headaches. For example, write down:    What you eat and drink.  How much sleep you get.  Any change to your diet or medicines.  Try massage or other relaxation techniques.  Limit stress.  Sit up straight, and do not tense your muscles.  Do not use any products that contain nicotine or tobacco, such as cigarettes, e-cigarettes, and chewing tobacco. If you need help quitting, ask your health care provider.  Exercise regularly as told by your health care provider.  Sleep on a regular schedule. Get 7–9 hours of sleep each night, or the amount recommended by your health care provider.  Keep all follow-up visits as told by your health care provider. This is important.    Contact a health care provider if:  Your symptoms are not helped by medicine.  You have a headache that is different from the usual headache.  You have nausea or you vomit.  You have a fever.    Get help right away if:  Your headache becomes severe quickly.  Your headache gets worse after moderate to intense physical activity.  You have repeated vomiting.  You have a stiff neck.  You have a loss of vision.  You have problems with speech.  You have pain in the eye or ear.  You have muscular weakness or loss of muscle control.  You lose your balance or have trouble walking.  You feel faint or pass out.  You have confusion.  You have a seizure.    Summary  A headache is pain or discomfort felt around the head or neck area.  There are many causes and types of headaches. In some cases, the cause may not be found.  Keep a headache journal to help find out what may trigger your headaches. Watch your condition for any changes. Let your health care provider know about them.  Contact a health care provider if you have a headache that is different from the usual headache, or if your symptoms are not helped by medicine.  Get help right away if your headache becomes severe, you vomit, you have a loss of vision, you lose your balance, or you have a seizure.

## 2022-09-09 NOTE — ED ADULT NURSE NOTE - OBJECTIVE STATEMENT
Pt presents ot ED with c/o headache for 1x day and right eye pain for 3x months. Pt denies blurred vision. Pt presents ot ED with c/o headache for 1x day and right eye pain for 3x months. Pt denies blurred vision. Pt denies any other symptoms.

## 2022-09-09 NOTE — ED PROVIDER NOTE - OBJECTIVE STATEMENT
42 year old male with no pertinent PMH presents with right sided headache today. Pt reports gradual onset right sided frontal headache today. Pt reports growth of skin from medial aspect of right eye over the past 3 months, attributes to current headache. Denies any fevers, chest pain, shortness of breath, abdominal pain, vomiting, diarrhea, vision change, pain with eye movement, numbness, weakness, or rash. Denies any recent injury or trauma. Denies worst headache of his life or sudden onset. Denies taking any OTC medications for his pain. Denies any additional complaints.

## 2022-09-26 ENCOUNTER — OUTPATIENT (OUTPATIENT)
Dept: OUTPATIENT SERVICES | Facility: HOSPITAL | Age: 42
LOS: 1 days | End: 2022-09-26
Payer: SELF-PAY

## 2022-09-26 DIAGNOSIS — Z11.52 ENCOUNTER FOR SCREENING FOR COVID-19: ICD-10-CM

## 2022-09-26 LAB — SARS-COV-2 RNA SPEC QL NAA+PROBE: SIGNIFICANT CHANGE UP

## 2022-09-26 PROCEDURE — C9803: CPT

## 2022-09-26 PROCEDURE — U0003: CPT

## 2022-09-26 PROCEDURE — U0005: CPT

## 2022-09-30 ENCOUNTER — APPOINTMENT (OUTPATIENT)
Dept: ULTRASOUND IMAGING | Facility: HOSPITAL | Age: 42
End: 2022-09-30

## 2022-09-30 ENCOUNTER — OUTPATIENT (OUTPATIENT)
Dept: OUTPATIENT SERVICES | Facility: HOSPITAL | Age: 42
LOS: 1 days | End: 2022-09-30
Payer: MEDICAID

## 2022-09-30 ENCOUNTER — RESULT REVIEW (OUTPATIENT)
Age: 42
End: 2022-09-30

## 2022-09-30 DIAGNOSIS — R74.8 ABNORMAL LEVELS OF OTHER SERUM ENZYMES: ICD-10-CM

## 2022-09-30 DIAGNOSIS — Z00.00 ENCOUNTER FOR GENERAL ADULT MEDICAL EXAMINATION WITHOUT ABNORMAL FINDINGS: ICD-10-CM

## 2022-09-30 PROCEDURE — 85610 PROTHROMBIN TIME: CPT

## 2022-09-30 PROCEDURE — 88341 IMHCHEM/IMCYTCHM EA ADD ANTB: CPT

## 2022-09-30 PROCEDURE — 76942 ECHO GUIDE FOR BIOPSY: CPT | Mod: 26

## 2022-09-30 PROCEDURE — 88313 SPECIAL STAINS GROUP 2: CPT | Mod: 26

## 2022-09-30 PROCEDURE — 88342 IMHCHEM/IMCYTCHM 1ST ANTB: CPT

## 2022-09-30 PROCEDURE — 85027 COMPLETE CBC AUTOMATED: CPT

## 2022-09-30 PROCEDURE — 47000 NEEDLE BIOPSY OF LIVER PERQ: CPT

## 2022-09-30 PROCEDURE — 85730 THROMBOPLASTIN TIME PARTIAL: CPT

## 2022-09-30 PROCEDURE — 88342 IMHCHEM/IMCYTCHM 1ST ANTB: CPT | Mod: 26

## 2022-09-30 PROCEDURE — 88341 IMHCHEM/IMCYTCHM EA ADD ANTB: CPT | Mod: 26

## 2022-09-30 PROCEDURE — 76942 ECHO GUIDE FOR BIOPSY: CPT

## 2022-09-30 PROCEDURE — 88307 TISSUE EXAM BY PATHOLOGIST: CPT

## 2022-09-30 PROCEDURE — 88307 TISSUE EXAM BY PATHOLOGIST: CPT | Mod: 26

## 2022-09-30 PROCEDURE — 88313 SPECIAL STAINS GROUP 2: CPT

## 2022-09-30 PROCEDURE — 36415 COLL VENOUS BLD VENIPUNCTURE: CPT

## 2022-10-21 ENCOUNTER — LABORATORY RESULT (OUTPATIENT)
Age: 42
End: 2022-10-21

## 2022-10-24 LAB
25(OH)D3 SERPL-MCNC: 25 NG/ML
AFP-TM SERPL-MCNC: 1.9 NG/ML
ALBUMIN SERPL ELPH-MCNC: 4.1 G/DL
ALP BLD-CCNC: 255 U/L
ALT SERPL-CCNC: 366 U/L
ANA PAT FLD IF-IMP: ABNORMAL
ANA SER IF-ACNC: ABNORMAL
ANION GAP SERPL CALC-SCNC: 14 MMOL/L
AST SERPL-CCNC: 269 U/L
BASOPHILS # BLD AUTO: 0.06 K/UL
BASOPHILS NFR BLD AUTO: 0.8 %
BILIRUB SERPL-MCNC: 1.5 MG/DL
BUN SERPL-MCNC: 10 MG/DL
CALCIUM SERPL-MCNC: 9.8 MG/DL
CERULOPLASMIN SERPL-MCNC: 38 MG/DL
CHLORIDE SERPL-SCNC: 97 MMOL/L
CHOLEST SERPL-MCNC: 300 MG/DL
CO2 SERPL-SCNC: 25 MMOL/L
CREAT SERPL-MCNC: 0.83 MG/DL
EGFR: 111 ML/MIN/1.73M2
EOSINOPHIL # BLD AUTO: 0.4 K/UL
EOSINOPHIL NFR BLD AUTO: 5.5 %
ESTIMATED AVERAGE GLUCOSE: 120 MG/DL
GGT SERPL-CCNC: 218 U/L
HBA1C MFR BLD HPLC: 5.8 %
HCT VFR BLD CALC: 43.4 %
HDLC SERPL-MCNC: 44 MG/DL
HGB BLD-MCNC: 14.5 G/DL
IGG SER QL IEP: 1759 MG/DL
IMM GRANULOCYTES NFR BLD AUTO: 0.1 %
INR PPP: 1.23 RATIO
LDLC SERPL CALC-MCNC: 236 MG/DL
LYMPHOCYTES # BLD AUTO: 1.95 K/UL
LYMPHOCYTES NFR BLD AUTO: 26.9 %
MAN DIFF?: NORMAL
MCHC RBC-ENTMCNC: 31 PG
MCHC RBC-ENTMCNC: 33.4 GM/DL
MCV RBC AUTO: 92.9 FL
MITOCHONDRIA AB SER IF-ACNC: NORMAL
MONOCYTES # BLD AUTO: 0.83 K/UL
MONOCYTES NFR BLD AUTO: 11.4 %
NEUTROPHILS # BLD AUTO: 4.01 K/UL
NEUTROPHILS NFR BLD AUTO: 55.3 %
NONHDLC SERPL-MCNC: 256 MG/DL
PLATELET # BLD AUTO: 240 K/UL
POTASSIUM SERPL-SCNC: 4.2 MMOL/L
PROT SERPL-MCNC: 7.8 G/DL
PT BLD: 14.5 SEC
RBC # BLD: 4.67 M/UL
RBC # FLD: 13.1 %
SMOOTH MUSCLE AB SER QL IF: NORMAL
SODIUM SERPL-SCNC: 136 MMOL/L
TRIGL SERPL-MCNC: 98 MG/DL
WBC # FLD AUTO: 7.26 K/UL

## 2022-10-25 LAB
COPPER SERPL-MCNC: 192 UG/DL
MISCELLANEOUS TEST: NORMAL
PROC NAME: NORMAL

## 2022-10-26 LAB
TPMT ENZYME INTERPRETATION: NORMAL
TPMT ENZYME METHODOLOGY: NORMAL
TPMT ENZYME: 13.9

## 2022-11-20 NOTE — ED PROVIDER NOTE - CPE EDP EYES NORM
Patients daughter in law Hannah Zheng called in to cancel patients appointment for November 21st and 22nd.  Patient is in the hospital.  Hannah's number to be reached at is    - - -

## 2023-01-18 ENCOUNTER — APPOINTMENT (OUTPATIENT)
Dept: HEPATOLOGY | Facility: CLINIC | Age: 43
End: 2023-01-18
Payer: MEDICAID

## 2023-01-18 PROCEDURE — 76981 USE PARENCHYMA: CPT

## 2023-05-05 ENCOUNTER — APPOINTMENT (OUTPATIENT)
Dept: HEPATOLOGY | Facility: CLINIC | Age: 43
End: 2023-05-05
Payer: MEDICAID

## 2023-05-05 VITALS
RESPIRATION RATE: 16 BRPM | SYSTOLIC BLOOD PRESSURE: 137 MMHG | WEIGHT: 154 LBS | BODY MASS INDEX: 22.81 KG/M2 | OXYGEN SATURATION: 99 % | TEMPERATURE: 98 F | DIASTOLIC BLOOD PRESSURE: 91 MMHG | HEART RATE: 66 BPM | HEIGHT: 69 IN

## 2023-05-05 DIAGNOSIS — E78.00 PURE HYPERCHOLESTEROLEMIA, UNSPECIFIED: ICD-10-CM

## 2023-05-05 DIAGNOSIS — Z23 ENCOUNTER FOR IMMUNIZATION: ICD-10-CM

## 2023-05-05 DIAGNOSIS — R76.8 OTHER SPECIFIED ABNORMAL IMMUNOLOGICAL FINDINGS IN SERUM: ICD-10-CM

## 2023-05-05 LAB
BASOPHILS # BLD AUTO: 0.05 K/UL
BASOPHILS NFR BLD AUTO: 1.2 %
EOSINOPHIL # BLD AUTO: 0.45 K/UL
EOSINOPHIL NFR BLD AUTO: 10.6 %
HCT VFR BLD CALC: 43 %
HGB BLD-MCNC: 14.2 G/DL
IMM GRANULOCYTES NFR BLD AUTO: 0 %
INR PPP: 1.15 RATIO
LYMPHOCYTES # BLD AUTO: 1.67 K/UL
LYMPHOCYTES NFR BLD AUTO: 39.2 %
MAN DIFF?: NORMAL
MCHC RBC-ENTMCNC: 30.1 PG
MCHC RBC-ENTMCNC: 33 GM/DL
MCV RBC AUTO: 91.1 FL
MONOCYTES # BLD AUTO: 0.48 K/UL
MONOCYTES NFR BLD AUTO: 11.3 %
NEUTROPHILS # BLD AUTO: 1.61 K/UL
NEUTROPHILS NFR BLD AUTO: 37.7 %
PLATELET # BLD AUTO: 184 K/UL
PT BLD: 13.4 SEC
RBC # BLD: 4.72 M/UL
RBC # FLD: 13.2 %
WBC # FLD AUTO: 4.26 K/UL

## 2023-05-05 PROCEDURE — 99215 OFFICE O/P EST HI 40 MIN: CPT

## 2023-05-05 RX ORDER — PREDNISONE 20 MG/1
20 TABLET ORAL
Qty: 30 | Refills: 0 | Status: DISCONTINUED | COMMUNITY
Start: 2022-10-19 | End: 2023-05-05

## 2023-05-05 NOTE — ASSESSMENT
[FreeTextEntry1] : Mr. WAGNER WEISS is 42-year-old male who is being seen for follow up visit with new advancing fibrosis > Cirrhosis in FS and elevated liver enzymes since 2017, 2/2 AILD, was started on Ursodiol in April 2018 by Dr. Kitchen. \par \par No recent labs/Imaging to review, ordered today, agreed to call me back to discuss. \par \par # Non-Adherence – Never took Prednisone 20mg as ordered, do not F/u with HEP to discuss the plan of care/Tx plan and pre-work for changes in liver health, no shows to RPAs, only + adherence to taking Ursodiol since April 2018. Reports to have [place] with no phone assess. \par \par # Elevated Liver enzymes – BW on 10/21/2022: AST//366, ALP/Tbil 255/1.5, GGT > 218, INR 1.23 (1st time ^) Reviewed the up-trending liver enzymes with extensive AI work up to date has been non diagnostic of the probable etiology. WDL- BMP reviewed in the past BW. \par + Cholestatic pattern noted with no obstruction in the previous imaging repat ordered imaging not done. \par + Bile Acids – Did not want to take the cholestyramine as the Ursodiol was giving his symptom relief from Pruritis. \par > I have recommended continuing to use Ursodiol 500 mg twice a day. I have reviewed the rationale behind this and the potential side effects. Although it does not seem to help lower the enzymes. \par > Repeat laboratory tests and MRCP/E (was not approved by his insurance), if there are no significant changes in the US/FS with labs ordered today, advised to call back to discuss the results. \par \par # AILD: Repat the BW today including the AI Panel. Did not start on Prednisone as ordered (was not aware of the need as he was symptom free) Re-educated the need of adherence to Tx plan especially with the progression in Fibrosis. 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  \par \par # Cirrhosis + on Fibroscan on 01/18/2023 shows F4 (E 18.4 kPa) and consistent/w Liver biopsy 09/30/2022: Chronic hepatitis with bile duct injury and marked interlobular bile duct loss. Focal bridging necrosis with focal bridging fibrosis. \par > Liver biopsy shows minimal portal fibrosis without bridging and no inflammation in 2016 and 2014. \par > HCC screening: WDL-AFP. No focal liver lesions in the last reviewed MRI March 5, 2021. \par > EV screening: WDL- CBC with no splenomegaly in the past imaging. \par > Calculated MELD-Na+ 13/3MM 6% CP score 5/A as per 10/21/2022 labs. \par \par # Fatty Liver + on Fibroscan on 01/18/2023 shows S1 ( dB/m) \par + Risk factors reviewed: A1C 5.8%, Cho 300/ / Non-, Reports to have been F/u with PCP, Will recheck the Fasting BW as ordered and will F/u with the PCP. 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 \par + Insufficient Vit D 25 – Advised to c/w daily vit D supplements. \par \par # Lymph nodes + in Reviewed his March 2021 MRI with him. I have recommended repeat MRI was not approved by his insurance in Feb 2022.  The lymphadenopathy previously seen on imaging in 2021 has not changed in size from those seen in 2020. \par \par PLAN to F/U in a month with a HEP MD to re-establish care, as DB is not with the [facility]. \par Encouraged to call back in the interim with any issues or concerns so that we can address and assist as required.

## 2023-05-05 NOTE — HISTORY OF PRESENT ILLNESS
[FreeTextEntry1] : Mr. WAGNER WEISS is 42-year-old male who is being seen for follow up visit with elevated liver enzymes since 2017, presumed to be AILD, was started on Ursodiol since April 2018 by Dr. Kitchen. Denies abdominal discomfort, pruritus.   \par \par Fibroscan on 01/18//2023 shows F4 (E 18.4 kPa) and S1 ( dB/m) \par Liver biopsy 09/30/2022: Chronic hepatitis with bile duct injury and marked interlobular bile duct loss. Focal bridging necrosis with focal bridging fibrosis. \par Liver biopsy March 21, 2016 showing minimal portal inflammation without fibrosis.  \par Liver biopsy December 21, 2014, shows mild portal fibrosis without bridging no steatosis or inflammation. \par + Immune to HAV (07/18/22) not to HBV with +anti-HBc, NR to HCV, COVID+ spike> 250. 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  \par \par Labs on 07/18/22 shows elevated AST//339, , , High Dbil 0.6, Bile acids 323.2; WDL- Tbil 0.9, AFP, INR, CBC, B12/folate; low Vit D 16.2. \par \par MRI March 5, 2021 showed no significant changes within the liver complex had 2 previous imaging. Blood tests January 21, 2021 alkaline phosphatase 295, ,  \par \par MRI July 10, 2020 shows a normal-appearing liver without steatosis and no focal lesions.  There is a description of diffuse abnormal restricted diffusion seen.  A periaortic lymph node has increased in size from 1.6 x 1.1 cm to 2.1 x 1.7 cm. \par Blood test February 6, 2020 alkaline phosphatase 408, ALT 48, AST 52, creatinine 0.84 CA 19-9 9, platelet count 258,000 antimitochondrial antibody negative \par Blood tests January 28, 2019 ALT 82, AST 38, alkaline phosphatase 248. \par \par MRI December 4, 2018 shows no lesions in the liver with an unremarkable biliary tree. \par Blood test November 7, 2018 platelet count 309,000, INR 1.16, ALT 78, AST 65, alkaline phosphatase 257, alpha-fetoprotein 1.6, CA 19-9 10 \par \par Abdominal sonogram September 11, 2018 shows no lesions in the liver and a normal-appearing liver \par Blood tests April 4, 2018 alkaline phosphatase 365, , , creatinine 1.26 \par Blood tests March 21, 2018 , , alkaline phosphatase 478, total bilirubin 1.2, and creatinine 0.72. Hepatitis A total antibody positive, hepatitis B surface antibody at a low level, hepatitis B core antibody positive \par Blood tests April 17, 2017 revealed a normal liver and normal bile ducts. Blood tests March 9, 2017 alkaline phosphatase 391, , AST 88, creatinine 0.88, ACE level normal AMA negative \par Blood tests December 7, 2015 alkaline phosphatase 360, , AST 81 total bilirubin 0.6, Ceruloplasmin 37, RENETTA negative, RENETTA negative, serum immunoglobulins were normal. GGTP 291, November 6, 2014 alkaline phosphatase 376, ALT 97, HIV negative, alpha-fetoprotein 1, \par \par MRI MRCP January 31, 2015 with no biliary tract abnormalities. Alkaline phosphatase isoenzymes revealed predominant liver. \par \par

## 2023-05-05 NOTE — PHYSICAL EXAM
[Non-Tender] : non-tender [Cognitive Mini-Mental Status Normal?] : Cognitive Mini Mental Status Exam is normal [General Appearance - Alert] : alert [General Appearance - In No Acute Distress] : in no acute distress [Sclera] : the sclera and conjunctiva were normal [Neck Appearance] : the appearance of the neck was normal [Neck Cervical Mass (___cm)] : no neck mass was observed [Jugular Venous Distention Increased] : there was no jugular-venous distention [Thyroid Diffuse Enlargement] : the thyroid was not enlarged [Thyroid Nodule] : there were no palpable thyroid nodules [Auscultation Breath Sounds / Voice Sounds] : lungs were clear to auscultation bilaterally [Heart Rate And Rhythm] : heart rate was normal and rhythm regular [Heart Sounds] : normal S1 and S2 [Heart Sounds Gallop] : no gallops [Murmurs] : no murmurs [Heart Sounds Pericardial Friction Rub] : no pericardial rub [Edema] : there was no peripheral edema [Veins - Varicosity Changes] : there were no varicosital changes [Bowel Sounds] : normal bowel sounds [Abdomen Soft] : soft [Abdomen Tenderness] : non-tender [Abdomen Mass (___ Cm)] : no abdominal mass palpated [Cervical Lymph Nodes Enlarged Posterior Bilaterally] : posterior cervical [No Spinal Tenderness] : no spinal tenderness [No CVA Tenderness] : no ~M costovertebral angle tenderness [Abnormal Walk] : normal gait [Nail Clubbing] : no clubbing  or cyanosis of the fingernails [Musculoskeletal - Swelling] : no joint swelling seen [] : no rash [Motor Tone] : muscle strength and tone were normal [Skin Lesions] : no skin lesions [Deep Tendon Reflexes (DTR)] : deep tendon reflexes were 2+ and symmetric [No Focal Deficits] : no focal deficits [Sensation] : the sensory exam was normal to light touch and pinprick [Oriented To Time, Place, And Person] : oriented to person, place, and time [Impaired Insight] : insight and judgment were intact [Affect] : the affect was normal [Hepatojugular Reflux] : patient did not have a sustained hepatojugular reflux [Spider Angioma] : No spider angioma(s) were observed [Abdominal Bruit] : no abdominal bruit [Abdominal  Ascites] : no ascites [Ascites Fluid Wave] : no ascites fluid wave [Splenomegaly] : no splenomegaly [Asterixis] : no asterixis observed [Jaundice] : No jaundice [Palmar Erythema] : no Palmar Erythema [Depression] : no depression

## 2023-05-08 LAB
25(OH)D3 SERPL-MCNC: 24.9 NG/ML
AFP-TM SERPL-MCNC: <1.8 NG/ML
DEPRECATED KAPPA LC FREE/LAMBDA SER: 1.52 RATIO
FOLATE SERPL-MCNC: 5.8 NG/ML
GGT SERPL-CCNC: 196 U/L
IGA SER QL IEP: 381 MG/DL
IGG SER QL IEP: 1728 MG/DL
IGM SER QL IEP: 39 MG/DL
KAPPA LC CSF-MCNC: 2.07 MG/DL
KAPPA LC SERPL-MCNC: 3.14 MG/DL
MITOCHONDRIA AB SER IF-ACNC: NORMAL
VIT B12 SERPL-MCNC: 486 PG/ML

## 2023-05-08 RX ORDER — PREDNISONE 20 MG/1
20 TABLET ORAL
Qty: 30 | Refills: 0 | Status: ACTIVE | COMMUNITY
Start: 2023-05-08 | End: 1900-01-01

## 2023-05-09 DIAGNOSIS — R79.89 OTHER SPECIFIED ABNORMAL FINDINGS OF BLOOD CHEMISTRY: ICD-10-CM

## 2023-05-09 LAB
ANA PAT FLD IF-IMP: ABNORMAL
ANA SER IF-ACNC: ABNORMAL

## 2023-05-09 RX ORDER — CHOLESTYRAMINE 4 G/5.5G
4 POWDER, FOR SUSPENSION ORAL DAILY
Qty: 90 | Refills: 3 | Status: ACTIVE | COMMUNITY
Start: 2023-05-09 | End: 1900-01-01

## 2023-05-10 LAB
AST SERPL W P-5'-P-CCNC: 108 IU/L
BILE AC SER-MCNC: 426.9 UMOL/L
CHOLEST SERPL-MCNC: 270 MG/DL
COMMENT:: NORMAL
FIBROSIS STAGE SERPL QL: NORMAL
FIBROSURE ALPHA 2 MACROGLOBULINS: 262 MG/DL
FIBROSURE ALT (SGPT): 148 IU/L
FIBROSURE APOLIPOPROTEIN A1: 118 MG/DL
FIBROSURE GGT: 206 IU/L
FIBROSURE HAPTOGLOBIN: <10 MG/DL
FIBROSURE SCORING: NORMAL
FIBROSURE TOTAL BILIRUBIN: 0.7 MG/DL
GLUCOSE SERPL-MCNC: 127 MG/DL
INTERPRETATIONS:: NORMAL
LIVER FIBR SCORE SERPL CALC.FIBROSURE: 0.89
Lab: NORMAL
NASH SCORING: NORMAL
NECROINFLAMMATORY ACT GRADE SERPL QL: NORMAL
NECROINFLAMMATORY ACT SCORE SERPL: 0.75
SERVICE CMNT-IMP: NORMAL
STEATOSIS GRADE: NORMAL
STEATOSIS GRADING: NORMAL
STEATOSIS SCORE: 0.74
TRIGL SERPL-MCNC: 137 MG/DL

## 2023-05-16 DIAGNOSIS — K74.02 HEPATIC FIBROSIS, ADVANCED FIBROSIS: ICD-10-CM

## 2023-05-16 DIAGNOSIS — R76.8 OTHER SPECIFIED ABNORMAL IMMUNOLOGICAL FINDINGS IN SERUM: ICD-10-CM

## 2023-05-16 DIAGNOSIS — R74.8 ABNORMAL LEVELS OF OTHER SERUM ENZYMES: ICD-10-CM

## 2023-05-16 DIAGNOSIS — K76.89 OTHER SPECIFIED DISEASES OF LIVER: ICD-10-CM

## 2023-05-18 LAB
ALBUMIN SERPL ELPH-MCNC: 4.2 G/DL
ALP BLD-CCNC: 218 U/L
ALT SERPL-CCNC: 128 U/L
ANION GAP SERPL CALC-SCNC: 13 MMOL/L
AST SERPL-CCNC: 93 U/L
BILIRUB SERPL-MCNC: 0.9 MG/DL
BUN SERPL-MCNC: 11 MG/DL
CALCIUM SERPL-MCNC: 10.1 MG/DL
CHLORIDE SERPL-SCNC: 100 MMOL/L
CO2 SERPL-SCNC: 26 MMOL/L
CREAT SERPL-MCNC: 0.82 MG/DL
EGFR: 111 ML/MIN/1.73M2
PHOSPHATIDYETHANOL (PETH), WHOLE BLOOD: NEGATIVE
POTASSIUM SERPL-SCNC: 4.3 MMOL/L
PROT SERPL-MCNC: 7.6 G/DL
SODIUM SERPL-SCNC: 139 MMOL/L

## 2023-07-14 ENCOUNTER — APPOINTMENT (OUTPATIENT)
Dept: HEPATOLOGY | Facility: CLINIC | Age: 43
End: 2023-07-14
